# Patient Record
Sex: FEMALE | Race: OTHER | Employment: UNEMPLOYED | URBAN - METROPOLITAN AREA
[De-identification: names, ages, dates, MRNs, and addresses within clinical notes are randomized per-mention and may not be internally consistent; named-entity substitution may affect disease eponyms.]

---

## 2018-03-14 ENCOUNTER — OFFICE VISIT (OUTPATIENT)
Dept: FAMILY MEDICINE CLINIC | Age: 33
End: 2018-03-14

## 2018-03-14 VITALS
TEMPERATURE: 99 F | SYSTOLIC BLOOD PRESSURE: 134 MMHG | HEIGHT: 59 IN | BODY MASS INDEX: 34.47 KG/M2 | WEIGHT: 171 LBS | HEART RATE: 67 BPM | DIASTOLIC BLOOD PRESSURE: 90 MMHG

## 2018-03-14 DIAGNOSIS — I10 ESSENTIAL HYPERTENSION: Primary | ICD-10-CM

## 2018-03-14 RX ORDER — NIFEDIPINE 30 MG/1
30 TABLET, EXTENDED RELEASE ORAL DAILY
Qty: 30 TAB | Refills: 5 | Status: SHIPPED | OUTPATIENT
Start: 2018-03-14 | End: 2018-09-26 | Stop reason: SDUPTHER

## 2018-03-14 NOTE — PROGRESS NOTES
Coordination of Care  1. Have you been to the ER, urgent care clinic since your last visit? Hospitalized since your last visit? No    2. Have you seen or consulted any other health care providers outside of the [address] [place] [address] since your last visit? Include any pap smears or colon screening. No    Does the patient need refills? N/A    Learning Assessment Complete?  yes

## 2018-03-14 NOTE — PROGRESS NOTES
Patient seen for discharge and no  needed per the patient. We reviewed the AVS, printed prescription and 5 printed Good Rx coupons. We also discussed the low sodium diet information sheet printed from clinical references. The patient expressed understanding and knows to return to a [facility] clinic in 5 months for a check-up.  Nohemi Balderrama RN

## 2018-03-14 NOTE — PROGRESS NOTES
Assessment/Plan:       ICD-10-CM ICD-9-CM    1. Essential hypertension I10 401.9 NIFEdipine ER (PROCARDIA XL) 30 mg ER tablet     Follow-up Disposition:  Return in about 5 months (around 2018) for return when you are getting ready to stop breastfeeding for HTN. [address] [facility] [address] [facility] [address] [address]  Phone: [phone] Fax: [phone]      [address]  Subjective:     Chief Complaint   Patient presents with    Elevated Blood Pressure    Morgan Gold is a 34 y.o. OTHER female. HPI: has a 2 month old. For the last 2 months her blood pressure was high. Required emergency . Yesterday she went to an appointment yesterday and bp was high again. Taking nifedipine 30mg ER. Yesterday it was 140/90. She  has no past medical history on file. Review of Systems: Negative for: fever, chest pain, shortness of breath, leg swelling, exertional dyspnea, palpitations. Current Medications:   Current Outpatient Prescriptions on File Prior to Visit   Medication Sig    naproxen (NAPROSYN) 500 mg tablet Take 1 Tab by mouth two (2) times daily (with meals). No current facility-administered medications on file prior to visit. Social History: She  reports that she has never smoked. She has never used smokeless tobacco. She reports that she does not drink alcohol or use illicit drugs. Objective:     Vitals:    18 0912   BP: 134/90   Pulse: 67   Temp: 99 °F (37.2 °C)   TempSrc: Oral   Weight: 171 lb (77.6 kg)   Height: 4' 11.06\" (1.5 m)    No LMP recorded. Patient is not currently having periods (Reason: Breastfeeding). Wt Readings from Last 2 Encounters:   18 171 lb (77.6 kg)   10/06/16 168 lb (76.2 kg)     No results found for any visits on 18. Physical Examination:  General appearance - well developed, no acute distress. Chest - clear to auscultation.   Heart - regular rate and rhythm without murmurs, rubs, or gallops. Abdomen - bowel sounds present x 4, NT, ND. Extremities - pulses intact. No peripheral edema. Assessment/Plan:   Diagnoses and all orders for this visit:    1. Essential hypertension  -     NIFEdipine ER (PROCARDIA XL) 30 mg ER tablet; Take 1 Tab by mouth daily. For blood pressure. Bengali sig      Follow-up Disposition:  Return in about 5 months (around 8/14/2018) for return when you are getting ready to stop breastfeeding for HTN. Nifedipine 30mg ER is safe for breastfeeding. Can switch to something less expensive when no longer breastfeeding. Patient plans to breastfeed for 6 months. Marley Michel, DILMA, FNP-BC, BC-ADM  Morgan Gold expressed understanding of this plan.

## 2018-09-26 ENCOUNTER — OFFICE VISIT (OUTPATIENT)
Dept: FAMILY MEDICINE CLINIC | Age: 33
End: 2018-09-26

## 2018-09-26 VITALS
HEIGHT: 59 IN | SYSTOLIC BLOOD PRESSURE: 136 MMHG | BODY MASS INDEX: 35.44 KG/M2 | DIASTOLIC BLOOD PRESSURE: 91 MMHG | WEIGHT: 175.8 LBS | HEART RATE: 81 BPM | TEMPERATURE: 97.6 F

## 2018-09-26 DIAGNOSIS — I10 ESSENTIAL HYPERTENSION: ICD-10-CM

## 2018-09-26 DIAGNOSIS — F41.9 ANXIETY DISORDER, UNSPECIFIED TYPE: Primary | ICD-10-CM

## 2018-09-26 PROBLEM — E66.01 SEVERE OBESITY (BMI 35.0-39.9): Status: ACTIVE | Noted: 2018-09-26

## 2018-09-26 RX ORDER — SERTRALINE HYDROCHLORIDE 25 MG/1
25 TABLET, FILM COATED ORAL DAILY
Qty: 90 TAB | Refills: 3 | Status: SHIPPED | OUTPATIENT
Start: 2018-09-26

## 2018-09-26 RX ORDER — NIFEDIPINE 30 MG/1
30 TABLET, EXTENDED RELEASE ORAL DAILY
Qty: 90 TAB | Refills: 3 | Status: SHIPPED | OUTPATIENT
Start: 2018-09-26

## 2018-09-26 NOTE — PROGRESS NOTES
AVS printed and reviewed. E scripts discussed and Good Rx printed for both at [facility]. Instructed to call C[facility] office if [facility] office does not call her w/in 1 week to schedule appt w/ counselor. Instructed to go to an ER for any emergency. Discussion assisted by [facility] , Kayla Austin.

## 2018-09-26 NOTE — MR AVS SNAPSHOT
Sandy Ayala 
 
 
 [address] [facility] [address] [place] 
[phone] Patient: Marilyn Frankel MRN: [id number] [id number] Visit Information Jacque Pelayo y Radha Personal Médico Departamento Teléfono del Dep. Número de visita 9/26/2018 10:45 AM [facility] [phone] Your Appointments 10/24/2018 12:30 PM  
[facility] [address] [facility] ([address] [place] [address]) Appt Note: Follow up per [place]  
 [address] [facility] [address] [facility] 7 [phone]  
[phone]  
  
   
 [address] [place] [phone] Upcoming Health Maintenance Date Due DTaP/Tdap/Td series (1 - Tdap) 6/10/2005 PAP AKA CERVICAL CYTOLOGY 6/10/2005 Influenza Age 5 to Adult 8/1/2018 Alergias  Review Complete El: 9/26/2018 Por: Som Brunson MD  
 [facility] del:  9/26/2018 No Known Allergies Vacunas actuales Angelina Darren No hay ninguna vacuna archivada. No revisadas esta visita You Were Diagnosed With   
  
 Ayleen Grow Anxiety disorder, unspecified type    -  Primary ICD-10-CM: F41.9 ICD-9-CM: 300.00 Essential hypertension     ICD-10-CM: I10 
ICD-9-CM: 401.9 Partes vitales PS Pulso Temperatura [place] ( percentil de crecimiento) Peso (percentil de crecimiento) BMI ([facility])  
 (!) 136/91 (BP 1 Location: Right arm, BP Patient Position: Sitting) 81 97.6 °F (36.4 °C) (Oral) 4' 11.06\" (1.5 m) 175 lb 12.8 oz (79.7 kg) 35.44 kg/m2 Estado obstétrico Estatus de tabaquísmo Breastfeeding Never Smoker Historial de signos vitales BMI and BSA Data Body Mass Index Body Surface Area  
 35.44 kg/m 2 1.82 m 2 Krishna Dasilva Pharmacy Name Phone Daquan Avilez [address] [facility] [address] [facility] lista de medicamentos actualizada Lista actualizada 9/26/18 11:13 AM.  Elbridge Wilfrid use clemens lista de medicamentos más reciente. naproxen 500 mg tablet También conocido byron:  NAPROSYN Take 1 Tab by mouth two (2) times daily (with meals). NIFEdipine ER 30 mg ER tablet También conocido byron:  PROCARDIA XL Take 1 Tab by mouth daily. For blood pressure. Indonesian sig  
  
 sertraline 25 mg tablet También conocido byron:  ZOLOFT Take 1 Tab by mouth daily. Recetas Enviado a la [place] Refills  
 sertraline (ZOLOFT) 25 mg tablet 3 Sig: Take 1 Tab by mouth daily. Class: Normal  
 Pharmacy: [address] [place] [address] #: [phone] Route: Oral  
 NIFEdipine ER (PROCARDIA XL) 30 mg ER tablet 3 Sig: Take 1 Tab by mouth daily. For blood pressure. Indonesian sig Class: Normal  
 Pharmacy: [address] [place] [address] Ph #: [phone] Route: Oral  
  
Instrucciones para el Paciente Trastorno de ansiedad: Instrucciones de cuidado - [ Anxiety Disorder: Care Instructions ] Instrucciones de cuidado La ansiedad es tiana reacción normal contra el estrés. Las situaciones difíciles le pueden causar síntomas byron sudoración en las erasmo de las virgil y tiana sensación de nerviosismo. En el trastorno de Radha, los síntomas son W jeff graves. La preocupación solitario, la tensión muscular, la dificultad para dormir, las náuseas y la diarrea, y otros síntomas pueden hacer que las actividades diarias se jose miguel difíciles o imposibles. Estos síntomas pueden ocurrir sin ninguna causa y pueden afectar clemens Kaycee Nicely y clemens sarai social. Los medicamentos, la asesoría psicológica y el cuidado personal pueden ayudar. La atención de seguimiento es tiana parte clave de clemens tratamiento y seguridad.  Asegúrese de hacer y acudir a todas las citas, y llame a clemens médico si está teniendo problemas. También es tiana buena idea saber los resultados de tejal exámenes y mantener tiana lista de los medicamentos que niesha. Cómo puede cuidarse en el hogar? · [place] tejal medicamentos exactamente [employer]	Price indicaron. Llame a clemens médico si albert estar teniendo problemas con clemens medicamento. · Asista a todas las citas de asesoría psicológica y [address] [place] Final. · Reconozca y acepte clemens ansiedad. Entonces, cuando esté en tiana situación que le ezequiel sentir ansioso, dígase a usted mismo, \"[place] no es tiana emergencia. Me siento incómodo, sherry no estoy en peligro. Puedo continuar aunque estoy ansioso\". · Cuide clemens cuerpo: ¨ Alivie la tensión con ejercicios o masajes. ¨ Descanse lo suficiente. ¨ Evite el alcohol, la cafeína, la nicotina y las drogas [address] Estas sustancias pueden aumentar los niveles de ansiedad y ocasionarle problemas para dormir. ¨ Aprenda y practique técnicas de relajación. A continuación encontrará más datos sobre estas técnicas. · Mantenga ocupada clemens mente. Salga y ezequiel actividades que le gustan. Vaya a neel tiana película cómica, o salga de paseo o a caminar. Planifique clemens día. Tener demasiadas actividades o muy pocas le puede crear ansiedad. · Lleve un registro de tejal síntomas. Hable sobre tejal temores con un buen amigo o un miembro de la [facility], o únase a un veronica de apoyo para personas con problemas similares. Hablar con otras personas a veces jonathon el estrés. · Participe en grupos sociales u ofrézcase byron voluntario para ayudar a otras personas. Estar solo a veces hace que las cosas parezcan peores de lo que son. · Ezequiel ejercicio por lo menos 30 minutos la [employer] días de la semana para aliviar el estrés. Caminar es tiana buena opción. Es posible que también quiera hacer otras actividades, byron correr, nadar, [employer], o jugar al tenis u otros deportes de equipo. Técnicas de relajación Ezequiel ejercicios de relajación louis 10 a 20 minutos al día. Si lo desea, mientras los hace puede escuchar música tranquila y relajante. · Dígales a las demás personas de clemens hogar que va a hacer ejercicios de relajación. Pídales que no lo interrumpan. · Encuentre un lugar cómodo, lejos de toda distracción y ruido. · Acuéstese boca arriba o siéntese con la espalda derecha. · Concéntrese en clemens respiración. Respire de [place] lenta y solitario. · Inhale por la Reyna Loft. Exhale por la nariz o la boca. · Respire profundamente, llenando la marley entre el ombligo y la caja torácica. Respire de forma dave que el estómago se mueva hacia arriba y [place]. · No contenga la respiración. · Respire de esta manera louis 5 a 10 minutos. [place]iba la sensación de serenidad en todo el cuerpo. Mientras continúa con la respiración lenta y profunda, relájese con los siguientes ejercicios louis otros 5 a 10 minutos: · Tensione y relaje cada veronica de músculos de clemens cuerpo. Puede comenzar con la punta de los pies y continuar hacia arriba hasta llegar a la naman. · Imagine a los grupos de músculos relajándose y poniéndose pesados. · Ponga la mente totalmente en rebolledo. 
· Permítase relajarse más y en forma más profunda. · Hawi consciencia del estado de serenidad que lo rodea. · Cuando termine el tiempo de [place], para recobrar el estado de [place], [place] los dedos de las virgil y los pies, luego las virgil y los pies, y por último, estire y [place] todo el cuerpo. A veces las personas se duermen louis la relajación, sherry usualmente se despiertan poco después. · Siempre tómese tiempo para regresar completamente a la consciencia antes de conducir un vehículo o hacer cualquier cosa que pudiera causar un accidente si no está completamente alerta. Nunca escuche música de relajación mientras conduce un automóvil. Cuándo debe pedir ayuda?  
Llame al 911 en cualquier momento que considere que necesita atención de urgencia. Por ejemplo, llame si: 
  · Siente que no puede dejar de lastimarse a sí mismo o a otra persona.  
 Tenga a mano los números de estas líneas telefónicas nacionales para la prevención del suicidio: [phone]-TALK ([phone]) y [phone]-[phone] ([phone]). Si usted o alguien que usted conoce habla acerca del suicidio o de sentirse desesperanzado, obtenga ayuda de inmediato. 
 Preste especial atención a los cambios en clemens mandeep y asegúrese de comunicarse con clemens médico si: 
  · Tiene ansiedad o miedo que afecta clemens sarai.  
  · Tiene síntomas de ansiedad nuevos o diferentes a los que tenía antes. Dónde puede encontrar más información en inglés? Ladarius Menchaca a [email or web address]-elvis.info/. Escriba P754 en la búsqueda para aprender más acerca de \"Trastorno de ansiedad: Instrucciones de cuidado - [ Anxiety Disorder: Care Instructions ]. \" 
Revisado: 7 diciembre, 2017 Versión del contenido: 11.7 © 6776-2607 Healthwise, Incorporated. Las instrucciones de cuidado fueron adaptadas bajo licencia por Good Help Connections (which disclaims liability or warranty for this information). Si usted tiene [place] afección médica o sobre estas instrucciones, siempre pregunte a clemens profesional de mandeep. Healthwise, Incorporated niega toda garantía o responsabilidad por clemens uso de esta información. Introducing hospitals HEALTH SERVICES! [facility] introduce portal paciente MyChart . Ahora se puede acceder a partes de clemens expediente médico, enviar por correo electrónico la oficina de clemens médico y solicitar renovaciones de medicamentos en línea. En clemens navegador de Internet , Ольгаne Martinez a [email or web address] Ezequiel vasquez en el usuario por Al Hernandez? [employer] vasquez aquí en la sesión Bender Albino. Verá la página de registro [place]. Ingrese clemens código de [facility] dave y byron aparece a continuación.  Usted no tendrá que [employer] código después de lata completado el proceso de registro . Si usted no se inscribe antes de la fecha de caducidad , debe solicitar un nuevo código. · MyChart Código de acceso : [id number] Expires: 12/25/2018 10:50 AM 
 
Ingresa los últimos cuatro dígitos de clemens Número de Seguro Social ( xxxx ) y fecha de nacimiento ( dd / mm / aaaa ) byron se indica y ezequiel clic en Enviar. Usted será llevado a la siguiente página de registro . Crear un ID MyChart . Esta será clemens ID de inicio de sesión de MyChart y no puede ser [place] , por lo que pensar en tiana que es Herrera Bravo y fácil de recordar . Crear tiana contraseña MyChart . Usted puede cambiar clemens contraseña en cualquier momento . Ingrese clemens Password Reset de preguntas y [place] . Radom se puede utilizar en un momento posterior si usted olvida clemens contraseña. Introduzca clemens dirección de correo electrónico . Bill Garcias recibirá tiana notificación por correo electrónico cuando la nueva información está disponible en MyChart . Hiram Louis clic en Registrarse. Ledon Breath neel y descargar porciones de clemens expediente médico. 
Ezequiel clic en el enlace de descarga del menú Resumen para descargar tiana copia portátil de clemens información médica . Si tiene [employer] , por favor visite la sección de preguntas frecuentes del sitio web MyChart . Recuerde, MyChart NO es que se utilizará para las necesidades urgentes. Para emergencias médicas , llame al 911 . Ahora disponible en clemens iPhone y Android ! Por favor proporcione cherelle resumen de la documentación de cuidado a clemens próximo proveedor. If you have any questions after today's visit, please call [phone].

## 2018-09-26 NOTE — PATIENT INSTRUCTIONS
Trastorno de ansiedad: Instrucciones de cuidado - [ Anxiety Disorder: Care Instructions ] Instrucciones de cuidado La ansiedad es tiana reacción normal contra el estrés. Las situaciones difíciles le pueden causar síntomas byron sudoración en las erasmo de las virgil y tiana sensación de nerviosismo. En el trastorno de Radha, los síntomas son W jeff graves. La preocupación solitario, la tensión muscular, la dificultad para dormir, las náuseas y la diarrea, y otros síntomas pueden hacer que las actividades diarias se jose miguel difíciles o imposibles. Estos síntomas pueden ocurrir sin ninguna causa y pueden afectar clemens Lisa Pun y clemens sarai social. Los medicamentos, la asesoría psicológica y el cuidado personal pueden ayudar. La atención de seguimiento es tiana parte clave de clemens tratamiento y seguridad. Asegúrese de hacer y acudir a todas las citas, y llame a clemens médico si está teniendo problemas. También es tiana buena idea saber los resultados de tejal exámenes y mantener tiana lista de los medicamentos que niesha. Cómo puede cuidarse en el hogar? · [place] tejal medicamentos exactamente [employer]	Cecil indicaron. Llame a clemens médico si albert estar teniendo problemas con clemens medicamento. · Asista a todas las citas de asesoría psicológica y [address] JJ/[place] Final. · Reconozca y acepte clemens ansiedad. Entonces, cuando esté en tiana situación que le rivera sentir ansioso, dígase a usted mismo, \"[place] no es tiana emergencia. Me siento incómodo, sherry no estoy en peligro. Puedo continuar aunque estoy ansioso\". · Cuide clemens cuerpo: ¨ Alivie la tensión con ejercicios o masajes. ¨ Descanse lo suficiente. ¨ Evite el alcohol, la cafeína, la nicotina y las drogas [address] Estas sustancias pueden aumentar los niveles de ansiedad y ocasionarle problemas para dormir. ¨ Aprenda y practique técnicas de relajación. A continuación encontrará más datos sobre estas técnicas. · Mantenga ocupada clemens mente. Salga y rivera actividades que le gustan.  Rosalio Flaherty a neel tiana película cómica, o salga de paseo o a caminar. Planifique clemesn día. Tener demasiadas actividades o muy pocas le puede crear ansiedad. · Lleve un registro de tejal síntomas. Hable sobre tejal temores con un buen amigo o un miembro de la [place], o únase a un veronica de apoyo para personas con problemas similares. Hablar con otras personas a veces jonathon el estrés. · Participe en grupos sociales u ofrézcase byron voluntario para ayudar a otras personas. Estar solo a veces hace que las cosas parezcan peores de lo que son. · Ezequiel ejercicio por lo menos 30 minutos la [employer] días de la semana para aliviar el estrés. Caminar es tiana buena opción. Es posible que también quiera hacer otras actividades, byron correr, nadar, [employer], o jugar al tenis u otros deportes de equipo. Técnicas de relajación Ezequiel ejercicios de relajación louis 10 a 20 minutos al día. Si lo desea, mientras los hace puede escuchar música tranquila y relajante. · Dígales a las demás personas de clemens hogar que va a hacer ejercicios de relajación. Pídales que no lo interrumpan. · Encuentre un lugar cómodo, lejos de toda distracción y ruido. · Acuéstese boca arriba o siéntese con la espalda derecha. · Concéntrese en clemens respiración. Respire de [place] lenta y solitario. · Inhale por la Venessa Stall. Exhale por la nariz o la boca. · Respire profundamente, llenando la marley entre el ombligo y la caja torácica. Respire de forma dave que el estómago se mueva hacia arriba y [place]. · No contenga la respiración. · Respire de esta manera louis 5 a 10 minutos. [place] la sensación de serenidad en todo el cuerpo. Mientras continúa con la respiración lenta y profunda, relájese con los siguientes ejercicios louis otros 5 a 10 minutos: · Tensione y relaje cada veronica de músculos de clemens cuerpo. Puede comenzar con la punta de los pies y continuar hacia arriba hasta llegar a la naman. · Imagine a los grupos de músculos relajándose y poniéndose pesados. · Ponga la mente totalmente en rebolledo. 
· Permítase relajarse más y en forma más profunda. · [place] consciencia del estado de serenidad que lo rodea. · Cuando termine el tiempo de [place], para recobrar el estado de [place], [place] los dedos de las virgil y los pies, luego las virgil y los pies, y por último, estire y [place] todo el cuerpo. A veces las personas se duermen louis la relajación, sherry usualmente se despiertan poco después. · Siempre tómese tiempo para regresar completamente a la consciencia antes de conducir un vehículo o hacer cualquier cosa que pudiera causar un accidente si no está completamente alerta. Nunca escuche música de relajación mientras conduce un automóvil. Cuándo debe pedir ayuda? Llame al 911 en cualquier momento que considere que necesita atención de [place]. Por ejemplo, llame si: 
  · Siente que no puede dejar de lastimarse a sí mismo o a otra persona.  
 Tenga a mano los números de estas líneas telefónicas nacionales para la prevención del suicidio: [phone]-TALK ([phone]) y [phone] ([phone]). Si usted o alguien que usted conoce habla acerca del suicidio o de sentirse desesperanzado, obtenga ayuda de inmediato. 
 Preste especial atención a los cambios en clemens mandeep y asegúrese de comunicarse con clemens médico si: 
  · Tiene ansiedad o miedo que afecta clemens sarai.  
  · Tiene síntomas de ansiedad nuevos o diferentes a los que tenía antes. Dónde puede encontrar más información en inglés? Sadia Cliff a http://melva-elvis.info/. Escriba P754 en la búsqueda para aprender más acerca de \"Trastorno de ansiedad: Instrucciones de cuidado - [ Anxiety Disorder: Care Instructions ]. \" 
Revisado: 7 kody, 2017 Versión del contenido: 11.7 © 4293-8764 [employer], Incorporated.  Las instrucciones de cuidado fueron adaptadas bajo licencia por Good Help Connections (which disclaims liability or warranty for this information). Si usted tiene [place] afección médica o sobre estas instrucciones, siempre pregunte a clemens profesional de mandeep. [facility]orporated niega toda garantía o responsabilidad por clemens uso de esta información.

## 2018-09-26 NOTE — PROGRESS NOTES
Coordination of Care 1. Have you been to the ER, urgent care clinic since your last visit? Hospitalized since your last visit? No 
 
2. Have you seen or consulted any other health care providers outside of the [address] [place] [address] since your last visit? Include any pap smears or colon screening. No 
 
Does the patient need refills? YES Learning Assessment Complete? yes Depression Screening complete in the past 12 months? yes

## 2018-09-26 NOTE — PROGRESS NOTES
HISTORY OF PRESENT ILLNESS Nickolas Villela is a 28 y.o. female. HPI Worried with anxiety. States that her nephew was going to be disconnected from life support. He had a condition with hydrocephalus and had been on life support for some time and this was very stressful. 9 months ago. Also when she was going to deliver, she needed an emergency . This was 2018. Since then has been very anxious. Able to sleep, feels tension in her head and back of the neck. Sometimes she is shaky Denies diarrhea, no abdominal pains. Does have excessive worries. Has not taken anything for the anxiety. PHQ 2 negative. Review of Systems Constitutional: Negative for fever, malaise/fatigue and weight loss. HENT: Negative for congestion, nosebleeds and sore throat. Respiratory: Negative for cough, shortness of breath and wheezing. Cardiovascular: Negative for chest pain and palpitations. Gastrointestinal: Negative for abdominal pain, constipation, diarrhea, heartburn and nausea. Psychiatric/Behavioral: Negative for depression, hallucinations, memory loss, substance abuse and suicidal ideas. The patient is nervous/anxious. The patient does not have insomnia. BP (!) 136/91 (BP 1 Location: Right arm, BP Patient Position: Sitting)  Pulse 81  Temp 97.6 °F (36.4 °C) (Oral)   Ht 4' 11.06\" (1.5 m)  Wt 175 lb 12.8 oz (79.7 kg)  BMI 35.44 kg/m2 Physical Exam  
Constitutional: She is oriented to person, place, and time. She appears well-developed and well-nourished. HENT:  
Head: Normocephalic. Right Ear: External ear normal.  
Left Ear: External ear normal.  
Eyes: Conjunctivae and EOM are normal. Pupils are equal, round, and reactive to light. Neck: Normal range of motion. Neck supple. Cardiovascular: Normal rate, regular rhythm and normal heart sounds. Exam reveals no friction rub. No murmur heard.  
Pulmonary/Chest: Effort normal and breath sounds normal. No respiratory distress. She has no wheezes. She has no rales. Abdominal: Soft. Bowel sounds are normal. She exhibits no distension. There is no tenderness. There is no rebound. Musculoskeletal: Normal range of motion. She exhibits no edema or deformity. Neurological: She is alert and oriented to person, place, and time. She has normal reflexes. Skin: Skin is warm. No rash noted. ASSESSMENT and PLAN Diagnoses and all orders for this visit: 
 
1. Anxiety disorder, unspecified type 
-     sertraline (ZOLOFT) 25 mg tablet; Take 1 Tab by mouth daily. 2. Essential hypertension 
-     NIFEdipine ER (PROCARDIA XL) 30 mg ER tablet; Take 1 Tab by mouth daily. For blood pressure. Malay sig 
 
 
refer to Benito Car: Generalized Anxiety disorder exacerbated by events in the family,  Started on sertraline 25 mg today

## 2018-10-25 ENCOUNTER — OFFICE VISIT (OUTPATIENT)
Dept: FAMILY MEDICINE CLINIC | Age: 33
End: 2018-10-25

## 2018-10-25 DIAGNOSIS — Z63.8 FAMILY DISCORD: ICD-10-CM

## 2018-10-25 DIAGNOSIS — F43.23 ADJUSTMENT DISORDER WITH MIXED ANXIETY AND DEPRESSED MOOD: Primary | ICD-10-CM

## 2018-10-25 SDOH — SOCIAL STABILITY - SOCIAL INSECURITY: OTHER SPECIFIED PROBLEMS RELATED TO PRIMARY SUPPORT GROUP: Z63.8
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
## 2018-11-29 ENCOUNTER — OFFICE VISIT (OUTPATIENT)
Dept: FAMILY MEDICINE CLINIC | Age: 33
End: 2018-11-29

## 2018-11-29 DIAGNOSIS — F43.23 ADJUSTMENT DISORDER WITH MIXED ANXIETY AND DEPRESSED MOOD: Primary | ICD-10-CM

## 2018-11-29 DIAGNOSIS — Z63.8 FAMILY DISCORD: ICD-10-CM

## 2018-11-29 SDOH — SOCIAL STABILITY - SOCIAL INSECURITY: OTHER SPECIFIED PROBLEMS RELATED TO PRIMARY SUPPORT GROUP: Z63.8

## 2018-11-29 NOTE — PROGRESS NOTES
Reports doing better. Is losing sleep because her 10 month old son does not sleep well, but not because she is worrying. Denies any problem with appetite. Received diagnosis for son and it has something to do with his brain functioning, but we could not figure out what it was in [place]. However, he continues to receive treatments. Doctors are unsure how he will develop. Mom worries and is depressed about this, but demonstrates ability to stay focused and to not allow this to overwhelm her. Has friends that she talks with; has sister close in town who is a support. She continues to be isolated because of her fear of driving, but today we explored this further and worked on improving her confidence to at least begin to drive in empty parking lots, etc., which she agreed to try to do. Sister is willing to help.  prescribed Zoloft, but Miguel Ray reported only taking it 'now and then when I feel I need it.' I provided psych-education on how this drug works, side effects, and the importance of having it onboard in case her stress level goes up immensely, as is possible with the condition of her son. She agreed to take it daily; also discussed how to stop taking it slowly as patients often decide to stop a medication when they are feeling better. She requested a follow up visit, stating that coming and talking about her life is very helpful and alleviates a lot of her stress and anxiety.

## 2022-02-16 ENCOUNTER — APPOINTMENT (OUTPATIENT)
Dept: GENERAL RADIOLOGY | Age: 37
End: 2022-02-16
Attending: STUDENT IN AN ORGANIZED HEALTH CARE EDUCATION/TRAINING PROGRAM

## 2022-02-16 ENCOUNTER — HOSPITAL ENCOUNTER (EMERGENCY)
Age: 37
Discharge: HOME | End: 2022-02-16

## 2022-02-16 VITALS
RESPIRATION RATE: 18 BRPM | BODY MASS INDEX: 36.29 KG/M2 | OXYGEN SATURATION: 100 % | SYSTOLIC BLOOD PRESSURE: 147 MMHG | HEART RATE: 61 BPM | WEIGHT: 180 LBS | TEMPERATURE: 98.4 F | DIASTOLIC BLOOD PRESSURE: 103 MMHG | HEIGHT: 59 IN

## 2022-02-16 DIAGNOSIS — M54.12 CERVICAL RADICULOPATHY: Primary | ICD-10-CM

## 2022-02-16 DIAGNOSIS — I10 ELEVATED BLOOD PRESSURE READING WITH DIAGNOSIS OF HYPERTENSION: ICD-10-CM

## 2022-02-16 LAB
ALBUMIN SERPL-MCNC: 3.8 G/DL (ref 3.5–5)
ALBUMIN/GLOB SERPL: 0.8 {RATIO} (ref 1.1–2.2)
ALP SERPL-CCNC: 101 U/L (ref 45–117)
ALT SERPL-CCNC: 34 U/L (ref 12–78)
ANION GAP SERPL CALC-SCNC: 4 MMOL/L (ref 5–15)
AST SERPL W P-5'-P-CCNC: 21 U/L (ref 15–37)
BASOPHILS # BLD: 0 K/UL (ref 0–0.1)
BASOPHILS NFR BLD: 0 % (ref 0–1)
BILIRUB SERPL-MCNC: 0.3 MG/DL (ref 0.2–1)
BUN SERPL-MCNC: 17 MG/DL (ref 6–20)
BUN/CREAT SERPL: 28 (ref 12–20)
CA-I BLD-MCNC: 9.7 MG/DL (ref 8.5–10.1)
CHLORIDE SERPL-SCNC: 104 MMOL/L (ref 97–108)
CO2 SERPL-SCNC: 28 MMOL/L (ref 21–32)
CREAT SERPL-MCNC: 0.61 MG/DL (ref 0.55–1.02)
DIFFERENTIAL METHOD BLD: ABNORMAL
EOSINOPHIL # BLD: 0.1 K/UL (ref 0–0.4)
EOSINOPHIL NFR BLD: 1 % (ref 0–7)
ERYTHROCYTE [DISTWIDTH] IN BLOOD BY AUTOMATED COUNT: 12.6 % (ref 11.5–14.5)
GLOBULIN SER CALC-MCNC: 4.8 G/DL (ref 2–4)
GLUCOSE SERPL-MCNC: 96 MG/DL (ref 65–100)
HCT VFR BLD AUTO: 41.1 % (ref 35–47)
HGB BLD-MCNC: 14 G/DL (ref 11.5–16)
IMM GRANULOCYTES # BLD AUTO: 0 K/UL (ref 0–0.04)
IMM GRANULOCYTES NFR BLD AUTO: 0 % (ref 0–0.5)
LYMPHOCYTES # BLD: 3.7 K/UL (ref 0.8–3.5)
LYMPHOCYTES NFR BLD: 37 % (ref 12–49)
MCH RBC QN AUTO: 30.2 PG (ref 26–34)
MCHC RBC AUTO-ENTMCNC: 34.1 G/DL (ref 30–36.5)
MCV RBC AUTO: 88.6 FL (ref 80–99)
MONOCYTES # BLD: 0.8 K/UL (ref 0–1)
MONOCYTES NFR BLD: 8 % (ref 5–13)
NEUTS SEG # BLD: 5.3 K/UL (ref 1.8–8)
NEUTS SEG NFR BLD: 54 % (ref 32–75)
NRBC # BLD: 0 K/UL (ref 0–0.01)
NRBC BLD-RTO: 0 PER 100 WBC
PLATELET # BLD AUTO: 332 K/UL (ref 150–400)
PMV BLD AUTO: 10.9 FL (ref 8.9–12.9)
POTASSIUM SERPL-SCNC: 3.6 MMOL/L (ref 3.5–5.1)
PROT SERPL-MCNC: 8.6 G/DL (ref 6.4–8.2)
RBC # BLD AUTO: 4.64 M/UL (ref 3.8–5.2)
SODIUM SERPL-SCNC: 136 MMOL/L (ref 136–145)
TROPONIN-HIGH SENSITIVITY: 5 NG/L (ref 0–51)
WBC # BLD AUTO: 10 K/UL (ref 3.6–11)

## 2022-02-16 PROCEDURE — 71045 X-RAY EXAM CHEST 1 VIEW: CPT

## 2022-02-16 PROCEDURE — 99282 EMERGENCY DEPT VISIT SF MDM: CPT

## 2022-02-16 PROCEDURE — 93005 ELECTROCARDIOGRAM TRACING: CPT

## 2022-02-16 PROCEDURE — 36415 COLL VENOUS BLD VENIPUNCTURE: CPT

## 2022-02-16 PROCEDURE — 80053 COMPREHEN METABOLIC PANEL: CPT

## 2022-02-16 PROCEDURE — 85025 COMPLETE CBC W/AUTO DIFF WBC: CPT

## 2022-02-16 PROCEDURE — 84484 ASSAY OF TROPONIN QUANT: CPT

## 2022-02-16 RX ORDER — IBUPROFEN 600 MG/1
600 TABLET ORAL
Qty: 20 TABLET | Refills: 0 | Status: SHIPPED | OUTPATIENT
Start: 2022-02-16 | End: 2022-02-23

## 2022-02-16 RX ORDER — METHYLPREDNISOLONE 4 MG/1
TABLET ORAL
Qty: 1 DOSE PACK | Refills: 0 | Status: SHIPPED | OUTPATIENT
Start: 2022-02-16

## 2022-02-16 RX ORDER — CYCLOBENZAPRINE HCL 5 MG
5 TABLET ORAL
Qty: 15 TABLET | Refills: 0 | Status: SHIPPED | OUTPATIENT
Start: 2022-02-16 | End: 2022-02-21

## 2022-02-16 NOTE — ED PROVIDER NOTES
EMERGENCY DEPARTMENT HISTORY AND PHYSICAL EXAM      Date: 2/16/2022  Patient Name: Daina Arvizu    History of Presenting Illness     Chief Complaint   Patient presents with    Arm Pain    Back Pain       History Provided By: Patient    HPI: Daina Arvizu, 39 y.o. female with a past medical history significant for hypertension who presents to the ED with cc of gradual onset, intermittent, achy left sided neck pain radiating into left shoulder and left scapular muscles x2 to 3 days. Symptoms exacerbated with movement. Alleviated with rest.  Has taken Tylenol, with no relief. No recent heavy lifting, injury, trauma. No history of heart disease. Denies any chest pain, shortness of breath, nausea, vomiting, numbness and tingling, weakness. There are no other complaints, changes, or physical findings at this time. PCP: None    No current facility-administered medications on file prior to encounter. Current Outpatient Medications on File Prior to Encounter   Medication Sig Dispense Refill    sertraline (ZOLOFT) 25 mg tablet Take 1 Tab by mouth daily. 90 Tab 3    NIFEdipine ER (PROCARDIA XL) 30 mg ER tablet Take 1 Tab by mouth daily. For blood pressure. [place] sig 90 Tab 3    naproxen (NAPROSYN) 500 mg tablet Take 1 Tab by mouth two (2) times daily (with meals). 61 Tab 1       Past History     Past Medical History:  Past Medical History:   Diagnosis Date    HTN (hypertension)        Past Surgical History:  History reviewed. No pertinent surgical history. Family History:  History reviewed. No pertinent family history. Social History:  Social History     Tobacco Use    Smoking status: Never Smoker    Smokeless tobacco: Never Used   Substance Use Topics    Alcohol use: No    Drug use: No       Allergies:  No Known Allergies      Review of Systems     Review of Systems   Constitutional: Negative for chills, fatigue and fever. HENT: Negative.     Respiratory: Negative for cough, chest tightness, shortness of breath and wheezing. Cardiovascular: Negative for chest pain and palpitations. Gastrointestinal: Negative for abdominal pain, diarrhea, nausea and vomiting. Genitourinary: Negative for frequency and urgency. Musculoskeletal: Positive for arthralgias (L shoulder-> L arm). Negative for back pain, neck pain and neck stiffness. Skin: Negative for rash. Neurological: Negative for dizziness, weakness, light-headedness and headaches. Psychiatric/Behavioral: Negative. All other systems reviewed and are negative. Physical Exam     Physical Exam  Vitals and nursing note reviewed. Constitutional:       General: She is not in acute distress. Appearance: Normal appearance. She is not ill-appearing or toxic-appearing. HENT:      Head: Normocephalic and atraumatic. Nose: Nose normal.      Mouth/Throat:      Mouth: Mucous membranes are moist.   Eyes:      General: Lids are normal.      Conjunctiva/sclera:      Right eye: Right conjunctiva is not injected. Left eye: Left conjunctiva is not injected. Neck:      Comments: No obvious deformities. Trachea midline. No visible bruising, redness, or swelling. No midline tenderness. No step off. +Left paracervical TTP. No nuchal rigidity. FROM without tenderness. Positive neer's test on the left. FROM L shoulder without any tenderness  Cardiovascular:      Rate and Rhythm: Normal rate and regular rhythm. Heart sounds: No murmur heard. No friction rub. No gallop. Pulmonary:      Effort: Pulmonary effort is normal. No tachypnea or respiratory distress. Breath sounds: Normal breath sounds. No stridor or decreased air movement. Musculoskeletal:         General: No swelling, tenderness, deformity or signs of injury. Normal range of motion. Cervical back: Normal range of motion and neck supple. Right lower leg: No edema. Left lower leg: No edema. Skin:     General: Skin is warm. Capillary Refill: Capillary refill takes less than 2 seconds. Neurological:      General: No focal deficit present. Mental Status: She is alert and oriented to person, place, and time. Mental status is at baseline. Comments: 5/5 strength in upper extremities   Psychiatric:         Mood and Affect: Mood normal.         Behavior: Behavior is cooperative. Thought Content: Thought content normal.         Judgment: Judgment normal.         Lab and Diagnostic Study Results     Labs -  Recent Results (from the past 24 hour(s))   CBC WITH AUTOMATED DIFF    Collection Time: 02/16/22  7:35 PM   Result Value Ref Range    WBC 10.0 3.6 - 11.0 K/uL    RBC 4.64 3.80 - 5.20 M/uL    HGB 14.0 11.5 - 16.0 g/dL    HCT 41.1 35.0 - 47.0 %    MCV 88.6 80.0 - 99.0 FL    MCH 30.2 26.0 - 34.0 PG    MCHC 34.1 30.0 - 36.5 g/dL    RDW 12.6 11.5 - 14.5 %    PLATELET 220 997 - 909 K/uL    MPV 10.9 8.9 - 12.9 FL    NRBC 0.0 0.0  WBC    ABSOLUTE NRBC 0.00 0.00 - 0.01 K/uL    NEUTROPHILS 54 32 - 75 %    LYMPHOCYTES 37 12 - 49 %    MONOCYTES 8 5 - 13 %    EOSINOPHILS 1 0 - 7 %    BASOPHILS 0 0 - 1 %    IMMATURE GRANULOCYTES 0 0 - 0.5 %    ABS. NEUTROPHILS 5.3 1.8 - 8.0 K/UL    ABS. LYMPHOCYTES 3.7 (H) 0.8 - 3.5 K/UL    ABS. MONOCYTES 0.8 0.0 - 1.0 K/UL    ABS. EOSINOPHILS 0.1 0.0 - 0.4 K/UL    ABS. BASOPHILS 0.0 0.0 - 0.1 K/UL    ABS. IMM.  GRANS. 0.0 0.00 - 0.04 K/UL    DF AUTOMATED     METABOLIC PANEL, COMPREHENSIVE    Collection Time: 02/16/22  7:35 PM   Result Value Ref Range    Sodium 136 136 - 145 mmol/L    Potassium 3.6 3.5 - 5.1 mmol/L    Chloride 104 97 - 108 mmol/L    CO2 28 21 - 32 mmol/L    Anion gap 4 (L) 5 - 15 mmol/L    Glucose 96 65 - 100 mg/dL    BUN 17 6 - 20 mg/dL    Creatinine 0.61 0.55 - 1.02 mg/dL    BUN/Creatinine ratio 28 (H) 12 - 20      GFR est AA >60 >60 ml/min/1.73m2    GFR est non-AA >60 >60 ml/min/1.73m2    Calcium 9.7 8.5 - 10.1 mg/dL    Bilirubin, total 0.3 0.2 - 1.0 mg/dL    AST (SGOT) 21 15 - 37 U/L    ALT (SGPT) 34 12 - 78 U/L    Alk. phosphatase 101 45 - 117 U/L    Protein, total 8.6 (H) 6.4 - 8.2 g/dL    Albumin 3.8 3.5 - 5.0 g/dL    Globulin 4.8 (H) 2.0 - 4.0 g/dL    A-G Ratio 0.8 (L) 1.1 - 2.2     TROPONIN-HIGH SENSITIVITY    Collection Time: 02/16/22  7:35 PM   Result Value Ref Range    Troponin-High Sensitivity 5 0 - 51 ng/L       Radiologic Studies -   XR CHEST PORT   Final Result   No acute pulmonary infiltrate in these underexpanded lungs. Medical Decision Making   - I am the first provider for this patient. - I reviewed the vital signs, available nursing notes, past medical history, past surgical history, family history and social history. - Initial assessment performed. The patients presenting problems have been discussed, and they are in agreement with the care plan formulated and outlined with them. I have encouraged them to ask questions as they arise throughout their visit. Vital Signs-Reviewed the patient's vital signs. Patient Vitals for the past 24 hrs:   Temp Pulse Resp BP SpO2   02/16/22 2100 98.4 °F (36.9 °C) 61 18 (!) 147/103 100 %   02/16/22 1927 98.4 °F (36.9 °C) 61 18 (!) 159/103 100 %   02/16/22 1744 98.4 °F (36.9 °C) 61 18 (!) 156/101 100 %     EKG interpretation: (Preliminary) 1750  Rhythm: normal sinus rhythm; and regular . Rate (approx.): 66; Axis: normal; P wave: normal; QRS interval: normal ; ST/T wave: normal; , QTc 436; LVH      Records Reviewed: Nursing Notes and Old Medical Records    The patient presents with L sided neck pain radiating down LUE with a differential diagnosis of cervical radiculopathy, DDD, ACS, muscle spasm      ED Course:          Orders Placed This Encounter    XR CHEST PORT     Standing Status:   Standing     Number of Occurrences:   1     Order Specific Question:   Reason for Exam     Answer:   chest pain     Order Specific Question:   Is Patient Pregnant?      Answer:   No    CBC WITH AUTOMATED DIFF     Standing Status:   Standing     Number of Occurrences:   1    METABOLIC PANEL, COMPREHENSIVE     Standing Status:   Standing     Number of Occurrences:   1    TROPONIN-HIGH SENSITIVITY     Standing Status:   Standing     Number of Occurrences:   1    CARDIAC MONITOR - ED ONLY     Standing Status:   Standing     Number of Occurrences:   1     Order Specific Question:   Type: Answer:   Bedside    OXYGEN CANNULA     Standing Status:   Standing     Number of Occurrences:   1     Order Specific Question:   Liters per minute: Answer:   2     Order Specific Question:   Indications for O2 therapy     Answer:   CHEST PAIN    VITAL SIGNS Per Protocol     Standing Status:   Standing     Number of Occurrences:   1    MEASURE HEIGHT     Standing Status:   Standing     Number of Occurrences:   1    WEIGH PATIENT     Standing Status:   Standing     Number of Occurrences:   1    OXYGEN CANNULA Liters per minute: 2; Indications for O2 therapy: CHEST PAIN CONTINUOUS STAT     Standing Status:   Standing     Number of Occurrences:   1     Order Specific Question:   Liters per minute: Answer:   2     Order Specific Question:   Indications for O2 therapy     Answer:   CHEST PAIN    EKG, 12 LEAD, INITIAL     Read by MD within 10 min of arrival. If positive follow STEMI protocol     Standing Status:   Standing     Number of Occurrences:   1     Order Specific Question:   Reason for Exam:     Answer:   chest pain    SALINE LOCK IV ONE TIME STAT     Standing Status:   Standing     Number of Occurrences:   1    methylPREDNISolone (Medrol, Andrew,) 4 mg tablet     Sig: See instructions     Dispense:  1 Dose Pack     Refill:  0    cyclobenzaprine (FLEXERIL) 5 mg tablet     Sig: Take 1 Tablet by mouth three (3) times daily as needed for Muscle Spasm(s) for up to 5 days. Dispense:  15 Tablet     Refill:  0    ibuprofen (MOTRIN) 600 mg tablet     Sig: Take 1 Tablet by mouth every six (6) hours as needed for Pain for up to 7 days. Dispense:  20 Tablet     Refill:  0       Provider Notes (Medical Decision Making):     MDM  Number of Diagnoses or Management Options  Cervical radiculopathy  Elevated blood pressure reading with diagnosis of hypertension  Diagnosis management comments:     41-year-old female with left-sided neck pain radiating down left upper extremity. Examination consistent with cervical radiculopathy. EKG as well as lab work was ordered by triagewhich was negative for acute process. Low suspicion for ACS causing her symptoms based on negative work-up. Discharged home with Medrol Dosepak, muscle relaxer, NSAIDs for symptomatic relief of cervical radiculopathy. Patient also found to have elevated blood pressure today. He is currently on antihypertensives. Recommend follow-up with PCP as an outpatient for blood pressure check and for consideration of medication changes    HYPERTENSION COUNSELING: Education was provided to the patient today regarding their hypertension. Patient is made aware of their elevated blood pressure and is instructed to follow up this week with their Primary Care for a recheck. Patient is counseled regarding consequences of chronic, uncontrolled hypertension including kidney disease, heart disease, stroke or even death. Patient states their understanding and agrees to follow up this week. Additionally, during their visit, I discussed sodium restriction, maintaining ideal body weight and regular exercise program as physiologic means to achieve blood pressure control. The patient will strive towards this. Patient afebrile, nontoxic appearing, stable VS, tolerating PO. Reviewed care plan with patient. Return precautions to ED discussed if symptoms worsen. Patient agreeable with plan of care.        Amount and/or Complexity of Data Reviewed  Clinical lab tests: ordered and reviewed  Tests in the radiology section of CPT®: ordered and reviewed  Review and summarize past medical records: yes    Patient Progress  Patient progress: stable           Disposition   Disposition: DC- Adult Discharges: All of the diagnostic tests were reviewed and questions answered. Diagnosis, care plan and treatment options were discussed. The patient understands the instructions and will follow up as directed. The patients results have been reviewed with them. They have been counseled regarding their diagnosis. The patient verbally convey understanding and agreement of the signs, symptoms, diagnosis, treatment and prognosis and additionally agrees to follow up as recommended with their PCP in 24 - 48 hours. They also agree with the care-plan and convey that all of their questions have been answered. I have also put together some discharge instructions for them that include: 1) educational information regarding their diagnosis, 2) how to care for their diagnosis at home, as well a 3) list of reasons why they would want to return to the ED prior to their follow-up appointment, should their condition change. Discharged    DISCHARGE PLAN:  1. Current Discharge Medication List      CONTINUE these medications which have NOT CHANGED    Details   sertraline (ZOLOFT) 25 mg tablet Take 1 Tab by mouth daily. Qty: 90 Tab, Refills: 3    Associated Diagnoses: Anxiety disorder, unspecified type      NIFEdipine ER (PROCARDIA XL) 30 mg ER tablet Take 1 Tab by mouth daily. For blood pressure. [place]ian sig  Qty: 90 Tab, Refills: 3    Associated Diagnoses: Essential hypertension      naproxen (NAPROSYN) 500 mg tablet Take 1 Tab by mouth two (2) times daily (with meals). Qty: 60 Tab, Refills: 1    Comments: Please print Pt instructions in Tristanian  Associated Diagnoses: Bilateral calf pain           2.    Follow-up Information     Follow up With Specialties Details Why Contact Info    Primary Care Provider  In 2 days      [address] [facility] EMERGENCY DEPT Emergency Medicine  As needed, If symptoms worsen [address] [facility] [address]  [phone]        3. Return to ED if worse   4. Discharge Medication List as of 2/16/2022  8:52 PM      START taking these medications    Details   methylPREDNISolone (Medrol, Andrew,) 4 mg tablet See instructions, Normal, Disp-1 Dose Pack, R-0      cyclobenzaprine (FLEXERIL) 5 mg tablet Take 1 Tablet by mouth three (3) times daily as needed for Muscle Spasm(s) for up to 5 days. , Normal, Disp-15 Tablet, R-0      ibuprofen (MOTRIN) 600 mg tablet Take 1 Tablet by mouth every six (6) hours as needed for Pain for up to 7 days. , Normal, Disp-20 Tablet, R-0         CONTINUE these medications which have NOT CHANGED    Details   sertraline (ZOLOFT) 25 mg tablet Take 1 Tab by mouth daily. , Normal, Disp-90 Tab, R-3      NIFEdipine ER (PROCARDIA XL) 30 mg ER tablet Take 1 Tab by mouth daily. For blood pressure. Swedish sig, Normal, Disp-90 Tab, R-3      naproxen (NAPROSYN) 500 mg tablet Take 1 Tab by mouth two (2) times daily (with meals). , NormalPlease print Pt instructions in SpanishDisp-60 Tab, R-1               Diagnosis     Clinical Impression:   1. Cervical radiculopathy    2. Elevated blood pressure reading with diagnosis of hypertension        Attestations:    BRIANNA Colmenares    Please note that this dictation was completed with [employer], the computer voice recognition software. Quite often unanticipated grammatical, syntax, homophones, and other interpretive errors are inadvertently transcribed by the computer software. Please disregard these errors. Please excuse any errors that have escaped final proofreading. Thank you.

## 2022-02-17 LAB
ATRIAL RATE: 66 BPM
CALCULATED P AXIS, ECG09: 25 DEGREES
CALCULATED R AXIS, ECG10: -11 DEGREES
CALCULATED T AXIS, ECG11: 40 DEGREES
DIAGNOSIS, 93000: NORMAL
P-R INTERVAL, ECG05: 148 MS
Q-T INTERVAL, ECG07: 416 MS
QRS DURATION, ECG06: 76 MS
QTC CALCULATION (BEZET), ECG08: 436 MS
VENTRICULAR RATE, ECG03: 66 BPM

## 2023-05-16 RX ORDER — NIFEDIPINE 30 MG/1
30 TABLET, EXTENDED RELEASE ORAL DAILY
COMMUNITY
Start: 2018-09-26

## 2023-05-16 RX ORDER — METHYLPREDNISOLONE 4 MG/1
TABLET ORAL
COMMUNITY
Start: 2022-02-16

## 2023-05-16 RX ORDER — SERTRALINE HYDROCHLORIDE 25 MG/1
25 TABLET, FILM COATED ORAL DAILY
COMMUNITY
Start: 2018-09-26

## 2023-05-16 RX ORDER — NAPROXEN 500 MG/1
500 TABLET ORAL 2 TIMES DAILY WITH MEALS
COMMUNITY
Start: 2016-08-24

## 2025-01-06 ENCOUNTER — TELEPHONE (OUTPATIENT)
Age: 40
End: 2025-01-06

## 2025-01-07 NOTE — TELEPHONE ENCOUNTER
The  services used. Pt called and has already reschedule, appt was cancelled due to weather.NISH ROMERO RN

## 2025-04-15 ENCOUNTER — TELEPHONE (OUTPATIENT)
Age: 40
End: 2025-04-15

## 2025-04-15 NOTE — TELEPHONE ENCOUNTER
Called patient to remind of upcoming appointment with [facility] on 4/24/2025- no answer. Left message with appt. Details and [facility] main office/mobile number  in case she is able to contact us back.   Farrah Del Castillo

## 2025-04-24 ENCOUNTER — OFFICE VISIT (OUTPATIENT)
Age: 40
End: 2025-04-24

## 2025-04-24 ENCOUNTER — HOSPITAL ENCOUNTER (OUTPATIENT)
Facility: HOSPITAL | Age: 40
Discharge: HOME | End: 2025-04-27

## 2025-04-24 DIAGNOSIS — Z12.31 VISIT FOR SCREENING MAMMOGRAM: ICD-10-CM

## 2025-04-24 DIAGNOSIS — Z01.419 ENCOUNTER FOR WELL WOMAN EXAM: Primary | ICD-10-CM

## 2025-04-24 PROCEDURE — 77063 BREAST TOMOSYNTHESIS BI: CPT

## 2025-04-24 NOTE — PROGRESS NOTES
[facility] HISTORY QUESTIONNAIRE     used  [] No    [x]   Yes    Ht--4'11    Wt--180 lbs    Do you have any breast concerns today?  ______none    Are you experiencing any of the following?   No Yes Comments   Nipple Discharge [x]                                  []                                     Breast Lump/Masses [x]                                  []                                     Breast Skin Changes [x]                                  []                                           When and where was last mammogram performed?  ____First     No Yes Comments   Have you ever had a mammogram that required additional follow up?  [x]     []        Have you ever had a breast biopsy? [x]                                  []                                     Do you have breast implants?  [x]        []                When and where was your last Pap test performed? ____October 2023 done [facility] dept and declined [facility] pap today. Told good for 5 years.     Have you ever had an abnormal Pap test? ( Please note, if Hx of Colposcopy, LEEP, CKC, TABATHA 2 or 3)  No Yes Comments   [x]                                  []                                       Have you been through menopause?   No Yes Date of LMP   [x]                                  []                                  4/19/2025     For patients who are still menstruating: Do you use any form of family planning? Condoms.     Have you had a hysterectomy?   No Yes Comments (why)   [x]                                  []                                        No Yes Comments   Vaginal Discharge [x]                                  []                                     Abnormal/unusual vaginal bleeding    (Post menopausal  [x]                                  []                                       Did your mother take ADAMARIS?  No Yes Unknown   []                                  []

## 2025-04-25 NOTE — PROGRESS NOTES
Pt presents for nurse visit annual mammo. I did not personally see the pt. Following for her mammo result

## 2025-04-28 ENCOUNTER — RESULTS FOLLOW-UP (OUTPATIENT)
Age: 40
End: 2025-04-28